# Patient Record
Sex: MALE | Race: WHITE | NOT HISPANIC OR LATINO | Employment: FULL TIME | URBAN - METROPOLITAN AREA
[De-identification: names, ages, dates, MRNs, and addresses within clinical notes are randomized per-mention and may not be internally consistent; named-entity substitution may affect disease eponyms.]

---

## 2022-02-10 ENCOUNTER — TELEPHONE (OUTPATIENT)
Dept: URGENT CARE | Facility: CLINIC | Age: 47
End: 2022-02-10

## 2022-02-23 ENCOUNTER — OFFICE VISIT (OUTPATIENT)
Dept: FAMILY MEDICINE CLINIC | Facility: CLINIC | Age: 47
End: 2022-02-23

## 2022-02-23 VITALS
RESPIRATION RATE: 16 BRPM | BODY MASS INDEX: 21.13 KG/M2 | HEIGHT: 65 IN | WEIGHT: 126.8 LBS | DIASTOLIC BLOOD PRESSURE: 70 MMHG | SYSTOLIC BLOOD PRESSURE: 130 MMHG

## 2022-02-23 DIAGNOSIS — A64 STI (SEXUALLY TRANSMITTED INFECTION): Primary | ICD-10-CM

## 2022-02-23 PROCEDURE — 99204 OFFICE O/P NEW MOD 45 MIN: CPT | Performed by: INTERNAL MEDICINE

## 2022-02-27 NOTE — PROGRESS NOTES
02/23/2022    CC: Exposure to STD (ongoing issue.  [facility])  .        HPI  Exposure to STD  The patient's pertinent negatives include no genital injury, genital lesions, penile discharge, penile pain, scrotal swelling or testicular pain. Primary symptoms comment: Patient relates that the skin on his penis burns most of the time. This is a chronic problem. The current episode started more than 1 month ago. The problem occurs constantly. The problem has been unchanged. The pain is mild. Nothing aggravates the symptoms. He has tried nothing for the symptoms.        Sidney Hood is a 47 y.o. male.      The following portions of the patient's history were reviewed and updated as appropriate: allergies, current medications, past family history, past medical history, past social history, past surgical history and problem list.    Problem List  There is no problem list on file for this patient.      Past Medical History  History reviewed. No pertinent past medical history.    Surgical History  History reviewed. No pertinent surgical history.    Family History  Family History   Problem Relation Age of Onset   • No Known Problems Mother    • No Known Problems Father        Social History  Social History    Tobacco Use      Smoking status: Never Smoker      Smokeless tobacco: Current User        Types: Chew       Is the Patient a current tobacco user? No    Allergies  No Known Allergies    Current Medications  No current outpatient medications on file.     Review of System  Review of Systems   Constitutional: Negative.    Eyes: Negative.    Respiratory: Negative.    Cardiovascular: Negative.    Genitourinary: Negative.  Negative for discharge, penile pain, scrotal swelling and testicular pain.     I have reviewed and confirmed the accuracy of the ROS as documented by the MA/LPN/RN Diomedes Pang MD    Vitals:    02/23/22 0923   BP: 130/70   Resp: 16     Body mass index is 21.1 kg/m².    Objective     Physical  Exam  Physical Exam  Constitutional:       Appearance: Normal appearance.   HENT:      Mouth/Throat:      Mouth: Mucous membranes are dry.   Cardiovascular:      Rate and Rhythm: Rhythm irregular.      Pulses: Normal pulses.      Heart sounds: Normal heart sounds.   Pulmonary:      Effort: Pulmonary effort is normal.      Breath sounds: Normal breath sounds.   Abdominal:      General: Abdomen is flat.      Palpations: Abdomen is soft.   Musculoskeletal:      Cervical back: Neck supple.   Neurological:      Mental Status: He is alert.           Diagnoses and all orders for this visit:    1. STI (sexually transmitted infection) (Primary)  -     Cancel: Neisseria Gonorrhea, PCR - Urine, Urine, Random Void  -     Cancel: Trichomonas vaginalis, PCR - , Urine, Clean Catch; Future  -     Cancel: RPR; Future  -     Cancel: HSV 1 and 2-Specific Ab, IgG; Future  -     Cancel: HIV-1/O/2 ANTIGEN/ANTIBODY, 4TH GENERATION; Future  -     Cancel: Hepatitis C RNA, quantitative, PCR (graph)  -     Cancel: Hepatitis panel, acute; Future  -     Hepatitis C RNA, quantitative, PCR (graph)  -     Hepatitis panel, acute  -     HIV-1/O/2 ANTIGEN/ANTIBODY, 4TH GENERATION  -     HSV 1 and 2-Specific Ab, IgG  -     RPR  -     Trichomonas vaginalis, PCR - Urine, Urine, Clean Catch      This 47-year-old patient presents at this time for evaluation.  He relates that he has had burning on the penis for several months.  He denies any penile discharge.  He relates he has had sexual relations with females only in the past several weeks.  He relates that he has no burning with urination.  He is under much stress as he relates he is going through divorce and is trying to get custody of his children.  He is sleeping poorly.  He was given sertraline in the past but he stopped this medication he also was drinking heavily in the past but relates that he has stopped over the past several weeks.  He relates that he stopped the sertraline because he felt that  taking the medication with drinking would cause problems.  He last took his medication over a year ago.  Examination of his penis and scrotum was unremarkable for any rashes or discharge.    Patient's history is somewhat nebulous.  Although he complains of a rash and burning along the scrotum penis and pubic area there is no rash seen nor any other abnormalities.  He has a significant history of being on Zoloft although he has been erratic in taking it.  We will first reevaluate the need for an antidepressant with the PQRS 9 but we will also check for any sexually transmitted disease.    Follow-up in the next few days.     Plan:  1.)  Evaluation for sexually transmitted disease  2.)  Schedule patient for PQRS now for evaluation of depression.  Diomedes Pang MD  02/23/2022

## 2022-02-28 LAB
HAV IGM SERPL QL IA: NEGATIVE
HBV CORE IGM SERPL QL IA: NEGATIVE
HBV SURFACE AG SERPL QL IA: NEGATIVE
HCV AB S/CO SERPL IA: 0.1 S/CO RATIO (ref 0–0.9)
HCV RNA SERPL NAA+PROBE-ACNC: NORMAL IU/ML
HIV 1+2 AB+HIV1 P24 AG SERPL QL IA: NON REACTIVE
HSV1 IGG SER IA-ACNC: 57 INDEX (ref 0–0.9)
HSV2 IGG SER IA-ACNC: <0.91 INDEX (ref 0–0.9)
RPR SER QL: NON REACTIVE
TEST INFORMATION: NORMAL

## 2022-03-02 ENCOUNTER — OFFICE VISIT (OUTPATIENT)
Dept: FAMILY MEDICINE CLINIC | Facility: CLINIC | Age: 47
End: 2022-03-02

## 2022-03-02 VITALS
DIASTOLIC BLOOD PRESSURE: 78 MMHG | HEIGHT: 65 IN | WEIGHT: 125 LBS | SYSTOLIC BLOOD PRESSURE: 120 MMHG | RESPIRATION RATE: 16 BRPM | BODY MASS INDEX: 20.83 KG/M2

## 2022-03-02 DIAGNOSIS — F34.1 DYSTHYMIA: Chronic | ICD-10-CM

## 2022-03-02 DIAGNOSIS — R10.2 PELVIC PAIN: Primary | Chronic | ICD-10-CM

## 2022-03-02 PROCEDURE — 99214 OFFICE O/P EST MOD 30 MIN: CPT | Performed by: INTERNAL MEDICINE

## 2022-04-01 ENCOUNTER — OFFICE VISIT (OUTPATIENT)
Dept: FAMILY MEDICINE CLINIC | Facility: CLINIC | Age: 47
End: 2022-04-01

## 2022-04-01 VITALS
DIASTOLIC BLOOD PRESSURE: 76 MMHG | BODY MASS INDEX: 20.62 KG/M2 | WEIGHT: 123.8 LBS | RESPIRATION RATE: 16 BRPM | SYSTOLIC BLOOD PRESSURE: 120 MMHG | HEIGHT: 65 IN

## 2022-04-01 DIAGNOSIS — F33.1 MODERATE EPISODE OF RECURRENT MAJOR DEPRESSIVE DISORDER: Primary | Chronic | ICD-10-CM

## 2022-04-01 PROCEDURE — 99214 OFFICE O/P EST MOD 30 MIN: CPT | Performed by: INTERNAL MEDICINE

## 2022-04-04 ENCOUNTER — TELEPHONE (OUTPATIENT)
Dept: FAMILY MEDICINE CLINIC | Facility: CLINIC | Age: 47
End: 2022-04-04

## 2022-04-04 RX ORDER — ESCITALOPRAM OXALATE 10 MG/1
10 TABLET ORAL DAILY
Qty: 30 TABLET | Refills: 4 | Status: SHIPPED | OUTPATIENT
Start: 2022-04-04

## 2022-04-04 NOTE — TELEPHONE ENCOUNTER
PATIENT CALLED AND STATE DR. ZAFAR WAS GOING TO SENT A PRESCRIPTION FOR DEPRESSION. HE IS CALLING TO SEE IF THE MEDICATION HAS BEEN SENT. HE WAS SEEN ON 4/1/22    [facility] #[id number] - [facility] [address] AT [facility] - [phone]  - [phone]   [phone]    CALL BACK NUMBER [phone]    PLEASE CALL WHEN SENT TO PHARMACY

## 2022-04-05 NOTE — PROGRESS NOTES
04/01/2022    CC: Depression (F/U..No other issues)  .        HPI  Depression  Visit Type: follow-up  Patient presents with the following symptoms: anhedonia, depressed mood, excessive worry and nervousness/anxiety.  Frequency of symptoms: most days   Severity: moderate   Sleep quality: poor           Subjective   Franklin Hood is a 47 y.o. male.      The following portions of the patient's history were reviewed and updated as appropriate: allergies, current medications, past family history, past medical history, past social history, past surgical history and problem list.    Problem List  There is no problem list on file for this patient.      Past Medical History  History reviewed. No pertinent past medical history.    Surgical History  History reviewed. No pertinent surgical history.    Family History  Family History   Problem Relation Age of Onset   • No Known Problems Mother    • No Known Problems Father        Social History  Social History    Tobacco Use      Smoking status: Never Smoker      Smokeless tobacco: Current User        Types: Chew       Is the Patient a current tobacco user? No    Allergies  No Known Allergies    Current Medications    Current Outpatient Medications:   •  escitalopram (Lexapro) 10 MG tablet, Take 1 tablet by mouth Daily., Disp: 30 tablet, Rfl: 4     Review of System  Review of Systems   Constitutional: Negative.    HENT: Negative.    Eyes: Negative.    Respiratory: Negative.    Cardiovascular: Negative.    Gastrointestinal: Negative.    Psychiatric/Behavioral: Positive for depressed mood. The patient is nervous/anxious.      I have reviewed and confirmed the accuracy of the ROS as documented by the MA/LPN/RN Diomedes Pang MD    Vitals:    04/01/22 1444   BP: 120/76   Resp: 16     Body mass index is 20.6 kg/m².    Objective     Physical Exam  Physical Exam  Constitutional:       Appearance: Normal appearance.   Cardiovascular:      Rate and Rhythm: Normal rate and regular rhythm.       Pulses: Normal pulses.      Heart sounds: Normal heart sounds.   Neurological:      Mental Status: He is alert.         Assessment/Plan      47-year-old patient presents today for follow-up.  In the interim of visits he was seen by urology who found no evidence of pathology.  A CT scan was scheduled for the pelvis but this is pending.  I feel the patient has depression and indeed his PHQ 9 score was 21.  The patient is convinced at this point that antidepressants would be helpful and is willing to start these.  He also relates he continues have trouble with his sleep.  Note is made that he has been on antidepressants in the past but stopped after a few days.        Diagnoses and all orders for this visit:    1. Moderate episode of recurrent major depressive disorder (HCC) (Primary)      Plan:  1.)  Start Lexapro 10 mg 1 tab p.o. every morning  2.)  Follow-up in 3 to 4 weeks for reevaluation.       Diomedes Pang MD  04/01/2022

## 2022-04-29 ENCOUNTER — OFFICE VISIT (OUTPATIENT)
Dept: FAMILY MEDICINE CLINIC | Facility: CLINIC | Age: 47
End: 2022-04-29

## 2022-04-29 VITALS
BODY MASS INDEX: 20.33 KG/M2 | WEIGHT: 122 LBS | DIASTOLIC BLOOD PRESSURE: 70 MMHG | HEIGHT: 65 IN | SYSTOLIC BLOOD PRESSURE: 106 MMHG | RESPIRATION RATE: 16 BRPM

## 2022-04-29 DIAGNOSIS — M79.601 PARESTHESIA AND PAIN OF BOTH UPPER EXTREMITIES: ICD-10-CM

## 2022-04-29 DIAGNOSIS — G47.9 SLEEP DISORDER: Primary | ICD-10-CM

## 2022-04-29 DIAGNOSIS — M79.602 PARESTHESIA AND PAIN OF BOTH UPPER EXTREMITIES: ICD-10-CM

## 2022-04-29 DIAGNOSIS — R20.2 PARESTHESIA AND PAIN OF BOTH UPPER EXTREMITIES: ICD-10-CM

## 2022-04-29 PROCEDURE — 99214 OFFICE O/P EST MOD 30 MIN: CPT | Performed by: INTERNAL MEDICINE

## 2022-05-03 NOTE — PROGRESS NOTES
04/29/2022    CC: Depression (F/U..No other issues)  .        HPI  Depression  Visit Type: follow-up  Patient presents with the following symptoms: anhedonia, depressed mood, nervousness/anxiety and restlessness.  Frequency of symptoms: most days   Severity: moderate   Sleep quality: fair  Nighttime awakenings: several         Subjective   Franklin Hood is a 47 y.o. male.      The following portions of the patient's history were reviewed and updated as appropriate: allergies, current medications, past family history, past medical history, past social history, past surgical history and problem list.    Problem List  There is no problem list on file for this patient.      Past Medical History  History reviewed. No pertinent past medical history.    Surgical History  History reviewed. No pertinent surgical history.    Family History  Family History   Problem Relation Age of Onset   • No Known Problems Mother    • No Known Problems Father        Social History  Social History    Tobacco Use      Smoking status: Never Smoker      Smokeless tobacco: Current User        Types: Chew       Is the Patient a current tobacco user? No    Allergies  No Known Allergies    Current Medications    Current Outpatient Medications:   •  escitalopram (Lexapro) 10 MG tablet, Take 1 tablet by mouth Daily., Disp: 30 tablet, Rfl: 4     Review of System  Review of Systems   Constitutional: Negative.    Respiratory: Negative.    Cardiovascular: Negative.    Gastrointestinal: Negative.    Endocrine: Negative.    Psychiatric/Behavioral: Positive for depressed mood. The patient is nervous/anxious.      I have reviewed and confirmed the accuracy of the ROS as documented by the MA/LPN/RN Diomedes Pang MD    Vitals:    04/29/22 1135   BP: 106/70   Resp: 16     Body mass index is 20.3 kg/m².    Objective     Physical Exam  Physical Exam  HENT:      Right Ear: Tympanic membrane normal.      Left Ear: Tympanic membrane normal.      Nose: Nose normal.    Cardiovascular:      Rate and Rhythm: Normal rate and regular rhythm.      Pulses: Normal pulses.   Pulmonary:      Effort: Pulmonary effort is normal.      Breath sounds: Normal breath sounds.   Neurological:      Mental Status: He is alert.         Assessment/Plan      This pleasant 47-year-old patient presents today for follow-up.  We saw him 3 weeks or so ago and started him on Lexapro after doing a PHQ-9 examination.  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  But he relates he still has trouble with sleep and especially with tingling sensation in his legs at night.  He has been seen by urology because of his complaint of tingling in the testicular area.  They have indicated that there is no urologic problem that they could see.  Patient relates that sometimes at night he has to get up and walk around before he can get back to bed.      Diagnoses and all orders for this visit:    1. Sleep disorder (Primary)  -     Ambulatory Referral to Sleep Medicine    2. Paresthesia and pain of both upper extremities  -     Ambulatory Referral to Neurology      Plan:  1.)  Continue Lexapro 10 mg 1 tab p.o. daily  2.)  Referral to sleep medicine for evaluation of sleep disorder.  3.)  Follow-up in 2 to 3 months.       Diomedes Pang MD  04/29/2022

## 2022-06-24 ENCOUNTER — OFFICE VISIT (OUTPATIENT)
Dept: SLEEP MEDICINE | Facility: HOSPITAL | Age: 47
End: 2022-06-24

## 2022-06-24 VITALS
OXYGEN SATURATION: 100 % | HEART RATE: 71 BPM | DIASTOLIC BLOOD PRESSURE: 75 MMHG | WEIGHT: 123 LBS | BODY MASS INDEX: 20.49 KG/M2 | HEIGHT: 65 IN | SYSTOLIC BLOOD PRESSURE: 134 MMHG

## 2022-06-24 DIAGNOSIS — F10.982 INSOMNIA DUE TO ALCOHOL: Primary | ICD-10-CM

## 2022-06-24 PROCEDURE — G0463 HOSPITAL OUTPT CLINIC VISIT: HCPCS

## 2022-06-24 RX ORDER — QUETIAPINE FUMARATE 25 MG/1
50 TABLET, FILM COATED ORAL NIGHTLY
Qty: 60 TABLET | Refills: 2 | Status: SHIPPED | OUTPATIENT
Start: 2022-06-24 | End: 2022-09-22

## 2023-05-05 ENCOUNTER — TELEPHONE (OUTPATIENT)
Dept: FAMILY MEDICINE CLINIC | Facility: CLINIC | Age: 48
End: 2023-05-05

## 2023-05-05 NOTE — TELEPHONE ENCOUNTER
Caller: Franklin Hood    Relationship: Self    Best call back number: [phone]    What was the call regarding: PATIENT STATES HE HAS BLOOD IN HIS RED EYE-IT STARTED LAST WEEK AND HEALED ALMOST ALL THE WAY UP BUT NOW IT IS BACK. HE FEELS LIKE THERE IS SOMETHING STUCK IN HIS EYE. SHOULD HE COME IN TO SEE [facility] TODAY OR GO SOMEWHERE ELSE? PLEASE ADVISE.     Do you require a callback: YES

## 2024-10-01 ENCOUNTER — OFFICE VISIT (OUTPATIENT)
Dept: FAMILY MEDICINE CLINIC | Facility: CLINIC | Age: 49
End: 2024-10-01
Payer: COMMERCIAL

## 2024-10-01 VITALS
SYSTOLIC BLOOD PRESSURE: 130 MMHG | DIASTOLIC BLOOD PRESSURE: 78 MMHG | BODY MASS INDEX: 20.83 KG/M2 | WEIGHT: 125 LBS | HEIGHT: 65 IN

## 2024-10-01 DIAGNOSIS — Z00.00 ENCOUNTER FOR PHYSICAL EXAMINATION: ICD-10-CM

## 2024-10-01 DIAGNOSIS — Z13.6 SCREENING FOR HYPERTENSION: Primary | ICD-10-CM

## 2024-10-01 DIAGNOSIS — Z13.220 SCREENING FOR HYPERLIPIDEMIA: ICD-10-CM

## 2024-10-01 DIAGNOSIS — Z13.0 SCREENING FOR DEFICIENCY ANEMIA: ICD-10-CM

## 2024-10-01 LAB
ALBUMIN SERPL-MCNC: 4.9 G/DL (ref 3.5–5.2)
ALBUMIN/GLOB SERPL: 2 G/DL
ALP SERPL-CCNC: 65 U/L (ref 39–117)
ALT SERPL-CCNC: 25 U/L (ref 1–41)
AST SERPL-CCNC: 33 U/L (ref 1–40)
BASOPHILS # BLD AUTO: 0.04 10*3/MM3 (ref 0–0.2)
BASOPHILS NFR BLD AUTO: 0.8 % (ref 0–1.5)
BILIRUB SERPL-MCNC: 0.3 MG/DL (ref 0–1.2)
BUN SERPL-MCNC: 12 MG/DL (ref 6–20)
BUN/CREAT SERPL: 16.2 (ref 7–25)
CALCIUM SERPL-MCNC: 9.9 MG/DL (ref 8.6–10.5)
CHLORIDE SERPL-SCNC: 101 MMOL/L (ref 98–107)
CHOLEST SERPL-MCNC: 252 MG/DL (ref 0–200)
CHOLEST/HDLC SERPL: 3.41 {RATIO}
CO2 SERPL-SCNC: 29.5 MMOL/L (ref 22–29)
CREAT SERPL-MCNC: 0.74 MG/DL (ref 0.76–1.27)
EGFRCR SERPLBLD CKD-EPI 2021: 112.5 ML/MIN/1.73
EOSINOPHIL # BLD AUTO: 0.15 10*3/MM3 (ref 0–0.4)
EOSINOPHIL NFR BLD AUTO: 3 % (ref 0.3–6.2)
ERYTHROCYTE [DISTWIDTH] IN BLOOD BY AUTOMATED COUNT: 12.5 % (ref 12.3–15.4)
GLOBULIN SER CALC-MCNC: 2.4 GM/DL
GLUCOSE SERPL-MCNC: 99 MG/DL (ref 65–99)
HCT VFR BLD AUTO: 42.9 % (ref 37.5–51)
HDLC SERPL-MCNC: 74 MG/DL (ref 40–60)
HGB BLD-MCNC: 14.7 G/DL (ref 13–17.7)
IMM GRANULOCYTES # BLD AUTO: 0.02 10*3/MM3 (ref 0–0.05)
IMM GRANULOCYTES NFR BLD AUTO: 0.4 % (ref 0–0.5)
LDLC SERPL CALC-MCNC: 170 MG/DL (ref 0–100)
LYMPHOCYTES # BLD AUTO: 1.5 10*3/MM3 (ref 0.7–3.1)
LYMPHOCYTES NFR BLD AUTO: 29.7 % (ref 19.6–45.3)
MCH RBC QN AUTO: 33.1 PG (ref 26.6–33)
MCHC RBC AUTO-ENTMCNC: 34.3 G/DL (ref 31.5–35.7)
MCV RBC AUTO: 96.6 FL (ref 79–97)
MONOCYTES # BLD AUTO: 0.56 10*3/MM3 (ref 0.1–0.9)
MONOCYTES NFR BLD AUTO: 11.1 % (ref 5–12)
NEUTROPHILS # BLD AUTO: 2.78 10*3/MM3 (ref 1.7–7)
NEUTROPHILS NFR BLD AUTO: 55 % (ref 42.7–76)
NRBC BLD AUTO-RTO: 0 /100 WBC (ref 0–0.2)
PLATELET # BLD AUTO: 299 10*3/MM3 (ref 140–450)
POTASSIUM SERPL-SCNC: 4.7 MMOL/L (ref 3.5–5.2)
PROT SERPL-MCNC: 7.3 G/DL (ref 6–8.5)
RBC # BLD AUTO: 4.44 10*6/MM3 (ref 4.14–5.8)
SODIUM SERPL-SCNC: 141 MMOL/L (ref 136–145)
TRIGL SERPL-MCNC: 54 MG/DL (ref 0–150)
VLDLC SERPL CALC-MCNC: 8 MG/DL (ref 5–40)
WBC # BLD AUTO: 5.05 10*3/MM3 (ref 3.4–10.8)

## 2024-10-01 PROCEDURE — 99396 PREV VISIT EST AGE 40-64: CPT | Performed by: INTERNAL MEDICINE

## 2024-10-01 NOTE — PROGRESS NOTES
10/01/2024    Assessment & Plan   This 49-year-old patient presents at this time for physical examination.  He has been lost to follow-up for about 2 years.  We saw him initially and felt that he had depression after he been receiving administering the PHQ-9 he was started on Lexapro which he relates he took for about a month or so.  He is also been referred to sleep medicine for evaluation and Dr. Mckenna, pulmonologist prescribed Seroquel 50 mg p.o. nightly for sleep.  The patient relates he took both medications for about a month and then stopped.  He relates he felt fine since then.  Note is made that he does have a flat affect I have some questions regarding his cognition.  Will bring him back for follow-up in the next few weeks.    Regarding anticipatory guidance we discussed the importance of keeping his LDL cholesterol less than 100.  Will give him a low-cholesterol diet sheet to be instituted if we find that his LDL is elevated and requiring diet.    Diagnoses and all orders for this visit:    1. Screening for hypertension (Primary)    2. Screening for hyperlipidemia    3. Screening for deficiency anemia    4. Encounter for physical examination      BMI is within normal parameters. No other follow-up for BMI required.       Plan #1.) will bring him back for follow-up in the next several weeks for more discussion regarding his depressed affect and why he stopped taking medications.    CC: Annual Exam (Pt is fasting)  .        HPI  History of Present Illness     Subjective   Franklin Hood is a 49 y.o. male.      The following portions of the patient's history were reviewed and updated as appropriate: allergies, current medications, past family history, past medical history, past social history, past surgical history, and problem list.    Problem List  There is no problem list on file for this patient.      Past Medical History  History reviewed. No pertinent past medical history.    Surgical History  History  reviewed. No pertinent surgical history.    Family History  Family History   Problem Relation Age of Onset    No Known Problems Mother     No Known Problems Father        Social History  Social History    Tobacco Use      Smoking status: Never      Smokeless tobacco: Current        Types: Chew       Is the Patient a current tobacco user? No    Allergies  No Known Allergies    Current Medications    Current Outpatient Medications:     QUEtiapine (SEROquel) 25 MG tablet, Take 2 tablets by mouth Every Night for 90 days., Disp: 60 tablet, Rfl: 2     Review of System  Review of Systems   Constitutional: Negative.    HENT: Negative.     Eyes: Negative.    Respiratory: Negative.     Cardiovascular: Negative.    Gastrointestinal: Negative.    Musculoskeletal: Negative.    Skin: Negative.    Psychiatric/Behavioral: Negative.       I have reviewed and confirmed the accuracy of the ROS as documented by the MA/LPN/RN Diomedes Pang MD    Vitals:    10/01/24 1012   BP: 130/78     Body mass index is 20.8 kg/m².    Objective     Physical Exam  Physical Exam  Vitals and nursing note reviewed.   Constitutional:       Appearance: He is well-developed.   HENT:      Head: Normocephalic and atraumatic.      Nose: Nose normal.   Eyes:      Extraocular Movements: Extraocular movements intact.      Conjunctiva/sclera: Conjunctivae normal.      Pupils: Pupils are equal, round, and reactive to light.   Cardiovascular:      Rate and Rhythm: Normal rate and regular rhythm.      Heart sounds: Normal heart sounds.   Pulmonary:      Effort: Pulmonary effort is normal.      Breath sounds: Normal breath sounds.   Abdominal:      General: Bowel sounds are normal.      Palpations: Abdomen is soft.   Musculoskeletal:         General: Normal range of motion.      Cervical back: Normal range of motion and neck supple.   Skin:     General: Skin is warm and dry.   Neurological:      Mental Status: He is alert and oriented to person, place, and time.    Psychiatric:         Behavior: Behavior normal.      Comments: Affect flat             Diomedes Pang MD  10/01/2024

## 2024-10-04 ENCOUNTER — TELEPHONE (OUTPATIENT)
Dept: FAMILY MEDICINE CLINIC | Facility: CLINIC | Age: 49
End: 2024-10-04
Payer: COMMERCIAL

## 2024-10-04 NOTE — TELEPHONE ENCOUNTER
----- Message from Diomedes Pang sent at 10/3/2024  8:24 PM EDT -----  Call patient to notify of results    Your recent blood test results were all essentially normal with the exception of your LDL/bad cholesterol which was very high.  I would like you to start the low-cholesterol diet sheet that we gave you and I will see you in follow-up in the next few weeks to discuss some more.

## 2024-10-04 NOTE — PROGRESS NOTES
Call patient to notify of results    Your recent blood test results were all essentially normal with the exception of your LDL/bad cholesterol which was very high.  I would like you to start the low-cholesterol diet sheet that we gave you and I will see you in follow-up in the next few weeks to discuss some more.

## 2024-11-05 ENCOUNTER — OFFICE VISIT (OUTPATIENT)
Dept: FAMILY MEDICINE CLINIC | Facility: CLINIC | Age: 49
End: 2024-11-05
Payer: COMMERCIAL

## 2024-11-05 VITALS
DIASTOLIC BLOOD PRESSURE: 70 MMHG | HEIGHT: 65 IN | SYSTOLIC BLOOD PRESSURE: 110 MMHG | BODY MASS INDEX: 21.16 KG/M2 | WEIGHT: 127 LBS

## 2024-11-05 DIAGNOSIS — E78.00 PURE HYPERCHOLESTEROLEMIA: Primary | ICD-10-CM

## 2024-11-05 PROCEDURE — 99213 OFFICE O/P EST LOW 20 MIN: CPT | Performed by: INTERNAL MEDICINE

## 2024-11-05 NOTE — PROGRESS NOTES
11/05/2024    Assessment & Plan   This 49-year-old patient presents today for follow-up of hyperlipidemia.  He relates that he has had no chest pain or shortness of breath in the interim of visits.  We discussed with him his blood test from 10/1/2024 which showed a normal CBC and a normal comprehensive metabolic profile with a very elevated LDL from his lipid profile.  He relates that he was fasting during the test.  His LDL showed a total cholesterol of 252 with a elevated HDL of 74 and a elevated LDL of 170.    We discussed with him the importance of better control of his cholesterol and the increase chance of premature atherosclerosis with such levels.  He is agreeable to following a low-cholesterol diet.  We discussed the diet with him with our handout and we will start him on atorvastatin 20 mg p.o. nightly.    He relates that he is no longer taking the Seroquel which was prescribed by his sleep medicine specialist he relates that his sleep problem has resolved.      Diagnoses and all orders for this visit:    1. Pure hypercholesterolemia (Primary)      BMI is within normal parameters. No other follow-up for BMI required.    Plan:  1.)  Start atorvastatin 20 mg p.o. nightly will later increase this to 40 mg if no side effects  2.)  Follow-up in 2 to 2-1/2 months for reevaluation.       CC: Hyperlipidemia (F/U---no other issues)  .        HPI  Hyperlipidemia         Subjective   Franklin Hood is a 49 y.o. male.      The following portions of the patient's history were reviewed and updated as appropriate: allergies, current medications, past family history, past medical history, past social history, past surgical history, and problem list.    Problem List  There is no problem list on file for this patient.      Past Medical History  History reviewed. No pertinent past medical history.    Surgical History  History reviewed. No pertinent surgical history.    Family History  Family History   Problem Relation Age of  Onset    No Known Problems Mother     No Known Problems Father        Social History  Social History    Tobacco Use      Smoking status: Never      Smokeless tobacco: Current        Types: Chew       Is the Patient a current tobacco user? No    Allergies  No Known Allergies    Current Medications  No current outpatient medications on file.     Review of System  Review of Systems   Constitutional: Negative.    HENT: Negative.     Eyes: Negative.    Respiratory: Negative.     Cardiovascular: Negative.      I have reviewed and confirmed the accuracy of the ROS as documented by the MA/LPN/RN Diomedes Pang MD    Vitals:    11/05/24 0734   BP: 110/70     Body mass index is 21.13 kg/m².    Objective     Physical Exam  Physical Exam  Constitutional:       Appearance: Normal appearance.   HENT:      Head: Normocephalic and atraumatic.      Nose: Nose normal.   Cardiovascular:      Rate and Rhythm: Normal rate and regular rhythm.   Pulmonary:      Effort: Pulmonary effort is normal.   Neurological:      Mental Status: He is alert.             Diomedes Pang MD  11/05/2024

## 2024-11-12 ENCOUNTER — TELEPHONE (OUTPATIENT)
Dept: FAMILY MEDICINE CLINIC | Facility: CLINIC | Age: 49
End: 2024-11-12

## 2024-11-12 NOTE — TELEPHONE ENCOUNTER
Caller: Franklin Hood    Relationship: Self    Best call back number: [phone]     What medication are you requesting: MEDICATION FOR HIGH CHOLESTEROL         If a prescription is needed, what is your preferred pharmacy and phone number:  [facility] #[id number] - [place] [id number] AT [address] [facility] - [phone]  - [phone]     Additional notes:    PATIENT WAS TOLD THIS MEDICATION WAS GOING TO BE CALLED IN AND THE PHARMACY STILL HAS NOT RECEIVED IT.      PLEASE CALL PATIENT ONCE THIS IS SENT OVER.

## 2024-11-13 RX ORDER — ROSUVASTATIN CALCIUM 10 MG/1
10 TABLET, COATED ORAL DAILY
Qty: 90 TABLET | Refills: 1 | Status: SHIPPED | OUTPATIENT
Start: 2024-11-13